# Patient Record
Sex: FEMALE | Race: WHITE | NOT HISPANIC OR LATINO | Employment: UNEMPLOYED | URBAN - METROPOLITAN AREA
[De-identification: names, ages, dates, MRNs, and addresses within clinical notes are randomized per-mention and may not be internally consistent; named-entity substitution may affect disease eponyms.]

---

## 2023-12-27 ENCOUNTER — HOSPITAL ENCOUNTER (EMERGENCY)
Facility: HOSPITAL | Age: 43
Discharge: LEFT BEFORE DISCHARGE | End: 2023-12-27
Attending: EMERGENCY MEDICINE
Payer: COMMERCIAL

## 2023-12-27 VITALS
TEMPERATURE: 98.7 F | OXYGEN SATURATION: 100 % | HEART RATE: 94 BPM | RESPIRATION RATE: 18 BRPM | HEIGHT: 64 IN | SYSTOLIC BLOOD PRESSURE: 127 MMHG | DIASTOLIC BLOOD PRESSURE: 90 MMHG | WEIGHT: 145.94 LBS | BODY MASS INDEX: 24.92 KG/M2

## 2023-12-27 DIAGNOSIS — G89.29 CHRONIC NONINTRACTABLE HEADACHE, UNSPECIFIED HEADACHE TYPE: Primary | ICD-10-CM

## 2023-12-27 DIAGNOSIS — H61.23 BILATERAL HEARING LOSS DUE TO CERUMEN IMPACTION: ICD-10-CM

## 2023-12-27 DIAGNOSIS — R51.9 CHRONIC NONINTRACTABLE HEADACHE, UNSPECIFIED HEADACHE TYPE: Primary | ICD-10-CM

## 2023-12-27 PROCEDURE — 99282 EMERGENCY DEPT VISIT SF MDM: CPT | Mod: 25

## 2023-12-27 PROCEDURE — 99281 EMR DPT VST MAYX REQ PHY/QHP: CPT | Performed by: EMERGENCY MEDICINE

## 2023-12-27 PROCEDURE — 69210 REMOVE IMPACTED EAR WAX UNI: CPT | Mod: 50 | Performed by: EMERGENCY MEDICINE

## 2023-12-27 RX ORDER — DIPHENHYDRAMINE HYDROCHLORIDE 50 MG/ML
25 INJECTION INTRAMUSCULAR; INTRAVENOUS ONCE
Status: DISCONTINUED | OUTPATIENT
Start: 2023-12-27 | End: 2023-12-28 | Stop reason: HOSPADM

## 2023-12-27 RX ORDER — METOCLOPRAMIDE HYDROCHLORIDE 5 MG/ML
10 INJECTION INTRAMUSCULAR; INTRAVENOUS ONCE
Status: DISCONTINUED | OUTPATIENT
Start: 2023-12-27 | End: 2023-12-28 | Stop reason: HOSPADM

## 2023-12-27 ASSESSMENT — PAIN DESCRIPTION - LOCATION
LOCATION_2: EAR
LOCATION_3: FACE
LOCATION: HEAD

## 2023-12-27 ASSESSMENT — PAIN SCALES - GENERAL
PAINLEVEL_OUTOF10: 4
PAINLEVEL_OUTOF10: 8
PAINLEVEL_OUTOF10: 10 - WORST POSSIBLE PAIN

## 2023-12-27 ASSESSMENT — PAIN DESCRIPTION - PAIN TYPE: TYPE: CHRONIC PAIN

## 2023-12-27 ASSESSMENT — PAIN DESCRIPTION - FREQUENCY: FREQUENCY: CONSTANT/CONTINUOUS

## 2023-12-27 ASSESSMENT — COLUMBIA-SUICIDE SEVERITY RATING SCALE - C-SSRS
6. HAVE YOU EVER DONE ANYTHING, STARTED TO DO ANYTHING, OR PREPARED TO DO ANYTHING TO END YOUR LIFE?: NO
2. HAVE YOU ACTUALLY HAD ANY THOUGHTS OF KILLING YOURSELF?: NO
1. IN THE PAST MONTH, HAVE YOU WISHED YOU WERE DEAD OR WISHED YOU COULD GO TO SLEEP AND NOT WAKE UP?: NO

## 2023-12-27 ASSESSMENT — PAIN - FUNCTIONAL ASSESSMENT: PAIN_FUNCTIONAL_ASSESSMENT: 0-10

## 2023-12-27 ASSESSMENT — PAIN DESCRIPTION - ORIENTATION
ORIENTATION_2: LEFT
ORIENTATION_3: LEFT

## 2023-12-27 ASSESSMENT — PAIN DESCRIPTION - DESCRIPTORS: DESCRIPTORS: PRESSURE

## 2023-12-28 NOTE — ED PROVIDER NOTES
HPI   Chief Complaint   Patient presents with    lt lymph node under lt jaw swollen and hard x3 months      Pt state HA lt side of head and pain lt ear  pt states hit head thinks last 2-3 months hit head on corner of dresser        HPI     HISTORY OF PRESENT ILLNESS:  Patient is a 43-year-old female presents the emergency department for headache.  Patient states that approximately 2 months ago, patient was on the floor accidentally struck her head hard against the dresser.  No loss of consciousness.  This was the left side of the head.  The patient states that the past few days, patient has been having worsening pain, specifically behind the left ear.  Patient denies any vision changes, blindness, nausea, vomiting.  The patient denies any arm or leg weakness.  Patient did not take anything for pain.    Past Medical History: Denies.  Patient on multivitamins.  Past Surgical History: Appendectomy and cholecystectomy  Family History: family history not pertinent to presenting problem or chief complaint  Social History: Denies cigarette smoking.  She was    __________________________________________________________  PHYSICAL EXAM:    Appearance:  female, appears stated age, appears to be in moderate distress, alert, oriented , cooperative   Skin: Intact,  dry skin, no lesions, rash, petechiae or purpura.   Eyes: PERRLA, EOMs intact,  Conjunctiva pink with no redness or exudates.    HENT: Normocephalic, atraumatic. Nares patent   Neck: Supple. Trachea at midline.   Pulmonary: Lung sounds are clear bilaterally.  There is no rales, rhonchi, or wheezing.  Cardiac: Regular rate and rhythm, no rubs, murmurs, or gallops. No JVD,   Abdomen: Abdomen is soft, nontender, and nondistended.  No palpable organomegaly.  No rebound or guarding.  No CVA tenderness. Nonsurgical abdomen  Genitourinary: Exam deferred.  Musculoskeletal: no edema, pain, cyanosis, or deformity in extremities. Pulses full and equal.   Neurological:   Cranial nerves are grossly intact, grossly normal sensation, no weakness, no focal findings identified.    __________________________________________________________  MEDICAL DECISION MAKING:    Patient was seen and examined. Differential diagnosis for left ear pain includes cerumen impaction, otitis media, mastoiditis, intracranial bleed, intracranial mass.  The patient had a a head trauma approximately 2 months ago and has been having headaches since.  The patient a few days ago was having worsening left ear pain.  Vital signs were overall reassuring.  At this point, I was concerned the patient may have a atypical mastoiditis.  I wanted to obtain labs which the patient refused.  She was agreeable with COVID flu swabs in addition to CT head scan.  I did disimpact her ears with a significant amount of cerumen removed.  I was informed by nursing staff the patient had refused all care, including CT scan.  I did go back and talk to the patient.  She currently has capacity.  I informed her that the CT head was to ensure there is no signs of infection in addition to any other causes for patient's headaches including intracranial bleeding or mass.  I did express concern that we could be missing some pathology and that it may cause worsening symptoms such as seizure, neurologic deficit, death.  Patient at this point did not want anything more.  She was still having some headache but the left ear pressure had significantly improved.  Patient wanted to sign out AGAINST MEDICAL ADVICE.  She was advised follow closely with primary care physician.  Patient understood that she could return to the emergency department at any point or call 911 with any changes in symptoms.  Patient verbalized understanding, and and sign AGAINST MEDICAL ADVICE.    Dennis Taylor  Emergency Medicine               Jak Coma Scale Score: 15                  Patient History   No past medical history on file.  Past Surgical History:   Procedure  Laterality Date    OTHER SURGICAL HISTORY  09/19/2022    Cholecystectomy    OTHER SURGICAL HISTORY  09/19/2022    Partial hysterectomy    OTHER SURGICAL HISTORY  09/19/2022    Appendectomy     No family history on file.  Social History     Tobacco Use    Smoking status: Not on file    Smokeless tobacco: Not on file   Substance Use Topics    Alcohol use: Not on file    Drug use: Not on file       Physical Exam   ED Triage Vitals [12/27/23 2209]   Temp Heart Rate Resp BP   37.1 °C (98.7 °F) 94 18 127/90      SpO2 Temp Source Heart Rate Source Patient Position   100 % Tympanic Monitor Sitting      BP Location FiO2 (%)     Left arm --       Physical Exam    ED Course & MDM   ED Course as of 12/28/23 0544   Wed Dec 27, 2023   2319 Patient refused nasal swabs or COVID, flu.  She also refused urine testing. [WJ]   2326 Patient reevaluated and ear pressure did resolve.  Patient is headache.  Patient is refusing everything now.  AMA. [WJ]      ED Course User Index  [WJ] Dennis Taylor DO         Diagnoses as of 12/28/23 0544   Chronic nonintractable headache, unspecified headache type   Bilateral hearing loss due to cerumen impaction       Medical Decision Making      Procedure  Ear Cerumen Removal    Performed by: Dennis Taylor DO  Authorized by: Dennis Taylor DO    Consent:     Consent obtained:  Verbal    Consent given by:  Patient    Risks discussed:  Bleeding, infection, pain, TM perforation, incomplete removal and dizziness    Alternatives discussed:  No treatment  Universal protocol:     Patient identity confirmed:  Verbally with patient and arm band  Procedure details:     Location:  L ear and R ear    Procedure type: curette      Procedure outcomes: cerumen removed    Post-procedure details:     Inspection:  No bleeding, TM intact and ear canal clear    Hearing quality:  Improved    Procedure completion:  Tolerated       Dennis Taylor DO  12/28/23 2340

## 2024-01-16 ENCOUNTER — APPOINTMENT (OUTPATIENT)
Dept: RADIOLOGY | Facility: HOSPITAL | Age: 44
End: 2024-01-16

## 2024-01-16 ENCOUNTER — HOSPITAL ENCOUNTER (EMERGENCY)
Facility: HOSPITAL | Age: 44
Discharge: HOME | End: 2024-01-16
Attending: EMERGENCY MEDICINE

## 2024-01-16 VITALS
OXYGEN SATURATION: 99 % | TEMPERATURE: 97.5 F | RESPIRATION RATE: 16 BRPM | SYSTOLIC BLOOD PRESSURE: 141 MMHG | HEIGHT: 64 IN | DIASTOLIC BLOOD PRESSURE: 95 MMHG | WEIGHT: 135 LBS | HEART RATE: 94 BPM | BODY MASS INDEX: 23.05 KG/M2

## 2024-01-16 DIAGNOSIS — S80.02XA CONTUSION OF LEFT KNEE, INITIAL ENCOUNTER: Primary | ICD-10-CM

## 2024-01-16 PROCEDURE — 99283 EMERGENCY DEPT VISIT LOW MDM: CPT | Performed by: EMERGENCY MEDICINE

## 2024-01-16 PROCEDURE — 73564 X-RAY EXAM KNEE 4 OR MORE: CPT | Mod: LEFT SIDE | Performed by: RADIOLOGY

## 2024-01-16 PROCEDURE — 73564 X-RAY EXAM KNEE 4 OR MORE: CPT | Mod: LT

## 2024-01-16 PROCEDURE — 2500000001 HC RX 250 WO HCPCS SELF ADMINISTERED DRUGS (ALT 637 FOR MEDICARE OP): Performed by: EMERGENCY MEDICINE

## 2024-01-16 RX ORDER — HYDROCODONE BITARTRATE AND ACETAMINOPHEN 5; 325 MG/1; MG/1
1 TABLET ORAL ONCE
Status: COMPLETED | OUTPATIENT
Start: 2024-01-16 | End: 2024-01-16

## 2024-01-16 RX ADMIN — HYDROCODONE BITARTRATE AND ACETAMINOPHEN 1 TABLET: 5; 325 TABLET ORAL at 18:08

## 2024-01-16 ASSESSMENT — PAIN DESCRIPTION - DESCRIPTORS: DESCRIPTORS: SHARP

## 2024-01-16 ASSESSMENT — PAIN DESCRIPTION - LOCATION: LOCATION: KNEE

## 2024-01-16 ASSESSMENT — PAIN SCALES - GENERAL: PAINLEVEL_OUTOF10: 10 - WORST POSSIBLE PAIN

## 2024-01-16 ASSESSMENT — PAIN DESCRIPTION - ORIENTATION: ORIENTATION: LEFT

## 2024-01-16 ASSESSMENT — PAIN DESCRIPTION - PAIN TYPE: TYPE: ACUTE PAIN

## 2024-01-16 ASSESSMENT — PAIN DESCRIPTION - FREQUENCY: FREQUENCY: CONSTANT/CONTINUOUS

## 2024-01-16 ASSESSMENT — PAIN - FUNCTIONAL ASSESSMENT: PAIN_FUNCTIONAL_ASSESSMENT: 0-10

## 2024-01-16 NOTE — ED PROVIDER NOTES
HPI   Chief Complaint   Patient presents with   • left knee/ shin pain       Presents with left knee pain.  She states that 3 days ago she was walking upstairs and tripped and fell and hit her left knee.  She has had pain ever since.  Is worse with walking and with bending.  In the medial and inferior portion of the knee.  She has tried Tylenol home without any relief.  She denies any hip or ankle pain on that left side.  She tried Tylenol at home without any relief.                          Jak Coma Scale Score: 15                  Patient History   No past medical history on file.  Past Surgical History:   Procedure Laterality Date   • OTHER SURGICAL HISTORY  09/19/2022    Cholecystectomy   • OTHER SURGICAL HISTORY  09/19/2022    Partial hysterectomy   • OTHER SURGICAL HISTORY  09/19/2022    Appendectomy     No family history on file.  Social History     Tobacco Use   • Smoking status: Not on file   • Smokeless tobacco: Not on file   Substance Use Topics   • Alcohol use: Not on file   • Drug use: Not on file       Physical Exam   ED Triage Vitals [01/16/24 1618]   Temp Heart Rate Resp BP   36.4 °C (97.5 °F) 94 16 (!) 141/95      SpO2 Temp Source Heart Rate Source Patient Position   99 % Temporal Monitor Standing      BP Location FiO2 (%)     Left arm --       Physical Exam  Constitutional:       Appearance: Normal appearance.   HENT:      Head: Normocephalic and atraumatic.      Nose: Nose normal.   Eyes:      Extraocular Movements: Extraocular movements intact.      Pupils: Pupils are equal, round, and reactive to light.   Musculoskeletal:      Cervical back: Normal range of motion.      Comments: Left knee has tenderness in the medial and inferior portion.  Mild swelling.  Ecchymosis noted on the inferior portion.  Limited range of motion secondary to pain.   Skin:     General: Skin is warm and dry.      Capillary Refill: Capillary refill takes less than 2 seconds.   Neurological:      General: No focal  deficit present.      Mental Status: She is alert and oriented to person, place, and time.   Psychiatric:         Mood and Affect: Mood normal.       Labs Reviewed - No data to display  XR knee left 4+ views   Final Result   No acute osseous abnormality of the knee.             MACRO:   None        Signed by: Daniel Scott 1/16/2024 5:43 PM   Dictation workstation:   [id number]        ED Course & MDM   Diagnoses as of 01/16/24 1819   Contusion of left knee, initial encounter       Medical Decision Making  Differentials include fracture, dislocation, contusion. Imaging studies, if performed, were independently reviewed and interpreted by myself and confirmed by radiologist. EKG(s), if performed, were interpreted by myself. Patient was given Norco for pain control.  X-rays of the left knee were obtained and shows no acute abnormalities.  Patient likely has a bony contusion.  Patient will have an Ace bandage placed on this area by nursing.  I recommended that she continue ice as well as Tylenol or ibuprofen for pain at home.  I will give her orthopedic follow-up if her pain persists.        Procedure  Procedures     Silvino Rogel MD  01/16/24 1819

## 2025-08-25 ENCOUNTER — APPOINTMENT (OUTPATIENT)
Dept: RADIOLOGY | Facility: HOSPITAL | Age: 45
End: 2025-08-25
Payer: COMMERCIAL

## 2025-08-25 ENCOUNTER — HOSPITAL ENCOUNTER (EMERGENCY)
Facility: HOSPITAL | Age: 45
Discharge: HOME | End: 2025-08-25
Attending: EMERGENCY MEDICINE
Payer: COMMERCIAL

## 2025-08-25 ENCOUNTER — APPOINTMENT (OUTPATIENT)
Dept: CARDIOLOGY | Facility: HOSPITAL | Age: 45
End: 2025-08-25
Payer: COMMERCIAL

## 2025-08-25 ASSESSMENT — PAIN SCALES - GENERAL
PAINLEVEL_OUTOF10: 8
PAINLEVEL_OUTOF10: 0 - NO PAIN
PAINLEVEL_OUTOF10: 8
PAINLEVEL_OUTOF10: 8

## 2025-08-25 ASSESSMENT — LIFESTYLE VARIABLES
TOTAL SCORE: 0
EVER FELT BAD OR GUILTY ABOUT YOUR DRINKING: NO
HAVE PEOPLE ANNOYED YOU BY CRITICIZING YOUR DRINKING: NO
HAVE YOU EVER FELT YOU SHOULD CUT DOWN ON YOUR DRINKING: NO
EVER HAD A DRINK FIRST THING IN THE MORNING TO STEADY YOUR NERVES TO GET RID OF A HANGOVER: NO

## 2025-08-25 ASSESSMENT — HEART SCORE
HEART SCORE: 1
ECG: NORMAL
TROPONIN: LESS THAN OR EQUAL TO NORMAL LIMIT
AGE: <45
RISK FACTORS: 1-2 RISK FACTORS
HISTORY: SLIGHTLY SUSPICIOUS

## 2025-08-25 ASSESSMENT — PAIN - FUNCTIONAL ASSESSMENT
PAIN_FUNCTIONAL_ASSESSMENT: 0-10
PAIN_FUNCTIONAL_ASSESSMENT: 0-10